# Patient Record
Sex: MALE | Race: WHITE | NOT HISPANIC OR LATINO | Employment: OTHER | URBAN - METROPOLITAN AREA
[De-identification: names, ages, dates, MRNs, and addresses within clinical notes are randomized per-mention and may not be internally consistent; named-entity substitution may affect disease eponyms.]

---

## 2018-06-26 ENCOUNTER — TELEPHONE (OUTPATIENT)
Dept: UROLOGY | Facility: CLINIC | Age: 83
End: 2018-06-26

## 2018-06-26 NOTE — TELEPHONE ENCOUNTER
----- Message from Elza Gonzalez sent at 6/26/2018  1:30 PM CDT -----  Type:  Sooner Apoointment Request    Caller is requesting a sooner appointment.  Caller declined first available appointment listed below.  Caller will not accept being placed on the waitlist and is requesting a message be sent to doctor.    Name of Caller: pt  When is the first available appointment?  oct  Symptoms:  Blood  In  urine  Best Call Back Number:  [phone]  Additional Information: pt  Wants to  Be seen   Sooner  By  Dr zelaya only

## 2018-07-06 ENCOUNTER — OFFICE VISIT (OUTPATIENT)
Dept: UROLOGY | Facility: CLINIC | Age: 83
End: 2018-07-06
Payer: MEDICARE

## 2018-07-06 VITALS
WEIGHT: 172 LBS | SYSTOLIC BLOOD PRESSURE: 85 MMHG | DIASTOLIC BLOOD PRESSURE: 58 MMHG | HEIGHT: 70 IN | HEART RATE: 68 BPM | BODY MASS INDEX: 24.62 KG/M2

## 2018-07-06 DIAGNOSIS — R31.9 HEMATURIA OF UNDIAGNOSED CAUSE: ICD-10-CM

## 2018-07-06 PROCEDURE — 99999 PR PBB SHADOW E&M-EST. PATIENT-LVL II: CPT | Mod: PBBFAC,,, | Performed by: UROLOGY

## 2018-07-06 PROCEDURE — 99203 OFFICE O/P NEW LOW 30 MIN: CPT | Mod: S$GLB,,, | Performed by: UROLOGY

## 2018-07-06 RX ORDER — RAMIPRIL 2.5 MG/1
CAPSULE ORAL
COMMUNITY
Start: 2018-06-12

## 2018-07-06 RX ORDER — GLUCOSAMINE/CHONDROITIN/C/MANG 500-400 MG
1500 CAPSULE ORAL
COMMUNITY

## 2018-07-06 RX ORDER — IBUPROFEN 100 MG/5ML
1000 SUSPENSION, ORAL (FINAL DOSE FORM) ORAL
COMMUNITY

## 2018-07-06 RX ORDER — CLOPIDOGREL BISULFATE 75 MG/1
75 TABLET ORAL
COMMUNITY

## 2018-07-06 RX ORDER — VITAMIN K2 40 MCG
1 TABLET ORAL
COMMUNITY

## 2018-07-06 RX ORDER — ROSUVASTATIN CALCIUM 10 MG/1
10 TABLET, COATED ORAL
COMMUNITY

## 2018-07-06 RX ORDER — PROPAFENONE HYDROCHLORIDE 225 MG/1
225 TABLET, FILM COATED ORAL
COMMUNITY

## 2018-07-06 NOTE — PROGRESS NOTES
CHIEF COMPLAINT:  Gross hematuria.    HISTORY OF PRESENT ILLNESS:  Mr. Trevino is an 89-year-old male who suffered  a fall in 04/27/2018 at home.  At that time, he consulted the Emergency  Room at [facility] and he had bruises in the upper extremities and  also in the right flank.  No CT scan or x-rays were performed at that time  according to the patient.  Everything went fine.  He was discharged home  with no observation.  He did well until approximately 3 weeks ago when he  started experiencing gross painless hematuria.  He refers that the  hematuria was only one episode.  He did reconsult the Emergency Room at  that time and he was referred to our office for further evaluation and  treatment.  The patient explained to me that he is not interested in any  workup due to his advanced age, he will not undergo a cystoscopy, renal  ultrasound and he did not even wants to have a urine cytology.  He did not  provide us with a specimen.  He refers that for him it is obvious that is a  relation between his fall and the hematuria a couple of months later.  I  explained to him that usually the hematuria is seen at the time of the  accident, but can be understand that there may be a hematoma that has  drained eventually.  I tried to convince the patient to at least to undergo  an ultrasound of the kidneys and retroperitoneum, but he refused it.  He  expressed to me that if he sees blood in the urine again, he is going to  return and at that point, he may change his mind, but at this point, he  feels free not doing anything.  All the questions were answered to his  satisfaction.  I was unable to take review of systems or physical  examination in this patient because he felt that this consultation was not  necessary.  He left the office in satisfactory condition.        EOR/HN dd: 07/06/2018 17:08:34 (CDT)   td: 07/07/2018 00:00:01 (CDT)  Doc ID #[id number]   Job ID #[id number]    CC:

## 2020-12-07 ENCOUNTER — OFFICE VISIT (OUTPATIENT)
Dept: FAMILY MEDICINE | Facility: CLINIC | Age: 85
End: 2020-12-07
Payer: MEDICARE

## 2020-12-07 VITALS
SYSTOLIC BLOOD PRESSURE: 108 MMHG | DIASTOLIC BLOOD PRESSURE: 62 MMHG | HEIGHT: 71 IN | WEIGHT: 180 LBS | HEART RATE: 73 BPM | BODY MASS INDEX: 25.2 KG/M2 | TEMPERATURE: 98 F | OXYGEN SATURATION: 98 % | RESPIRATION RATE: 18 BRPM

## 2020-12-07 DIAGNOSIS — R26.81 UNSTEADINESS ON FEET: ICD-10-CM

## 2020-12-07 DIAGNOSIS — I10 ESSENTIAL HYPERTENSION: ICD-10-CM

## 2020-12-07 DIAGNOSIS — E78.00 HYPERCHOLESTEROLEMIA: ICD-10-CM

## 2020-12-07 DIAGNOSIS — I25.10 CORONARY ARTERY DISEASE INVOLVING NATIVE CORONARY ARTERY OF NATIVE HEART WITHOUT ANGINA PECTORIS: Primary | ICD-10-CM

## 2020-12-07 PROCEDURE — 99214 OFFICE O/P EST MOD 30 MIN: CPT | Mod: S$GLB,,, | Performed by: INTERNAL MEDICINE

## 2020-12-07 PROCEDURE — 1100F PTFALLS ASSESS-DOCD GE2>/YR: CPT | Mod: CPTII,S$GLB,, | Performed by: INTERNAL MEDICINE

## 2020-12-07 PROCEDURE — 99214 PR OFFICE/OUTPT VISIT, EST, LEVL IV, 30-39 MIN: ICD-10-PCS | Mod: S$GLB,,, | Performed by: INTERNAL MEDICINE

## 2020-12-07 PROCEDURE — 1126F PR PAIN SEVERITY QUANTIFIED, NO PAIN PRESENT: ICD-10-PCS | Mod: S$GLB,,, | Performed by: INTERNAL MEDICINE

## 2020-12-07 PROCEDURE — 1126F AMNT PAIN NOTED NONE PRSNT: CPT | Mod: S$GLB,,, | Performed by: INTERNAL MEDICINE

## 2020-12-07 PROCEDURE — 1100F PR PT FALLS ASSESS DOC 2+ FALLS/FALL W/INJURY/YR: ICD-10-PCS | Mod: CPTII,S$GLB,, | Performed by: INTERNAL MEDICINE

## 2020-12-07 PROCEDURE — 3288F FALL RISK ASSESSMENT DOCD: CPT | Mod: CPTII,S$GLB,, | Performed by: INTERNAL MEDICINE

## 2020-12-07 PROCEDURE — 3288F PR FALLS RISK ASSESSMENT DOCUMENTED: ICD-10-PCS | Mod: CPTII,S$GLB,, | Performed by: INTERNAL MEDICINE

## 2020-12-07 RX ORDER — ACETAMINOPHEN 500 MG
1 TABLET ORAL DAILY
COMMUNITY

## 2020-12-07 RX ORDER — VIT C/E/ZN/COPPR/LUTEIN/ZEAXAN 250MG-90MG
1000 CAPSULE ORAL DAILY
COMMUNITY

## 2020-12-07 RX ORDER — BENAZEPRIL HYDROCHLORIDE 5 MG/1
5 TABLET ORAL DAILY
COMMUNITY

## 2020-12-07 NOTE — PROGRESS NOTES
Subjective:       Patient ID: Leonardo Trevino is a 89 y.o. male.    Chief Complaint: Follow-up    Patient is here for follow-up of his chronic hypertension and prior CBCs and hypercholesterolemia.  On review of the medication list there are no need for refills at this time.    With regards to the review of systems is unremarkable as it pertains to Cardiovascular central nervous system  GI or musculoskeletal complaints.    Patient is up-to-date with his flu shot for the season.    There is no symptomatology to suggest COVID-19.    He sequela.  These doses seem to be has had 3 or 4 falls in the last 120 days so far without any significant sequela.  This does not seem to be related to a syncopal episode but nor can he gave a history to suggest that he was tripping on something.  He still uses a walking cane for stability and safety.  I do not believe that the patient would benefit from physical therapy at this time nor is he agreeable to same.    His next follow-up with cardiologist Dr. Glen Chao will be the 2nd to 3rd week in January of 2021.    Review of Systems   HENT: Negative for drooling and mouth sores.    Respiratory: Negative for stridor.    Gastrointestinal: Negative for rectal pain.   Genitourinary: Negative for decreased urine volume and enuresis.   Allergic/Immunologic: Negative for frequent infections.   All other systems reviewed and are negative.        Objective:      Physical Exam  Vitals signs and nursing note reviewed.   Constitutional:       Appearance: Normal appearance. He is obese.   HENT:      Head: Normocephalic and atraumatic.      Right Ear: Tympanic membrane, ear canal and external ear normal. There is no impacted cerumen.      Left Ear: Tympanic membrane, ear canal and external ear normal. There is no impacted cerumen.      Nose: No congestion or rhinorrhea.      Mouth/Throat:      Mouth: Mucous membranes are moist.      Pharynx: No oropharyngeal exudate or posterior oropharyngeal  erythema.   Eyes:      General: No scleral icterus.        Right eye: No discharge.         Left eye: No discharge.      Extraocular Movements: Extraocular movements intact.      Conjunctiva/sclera: Conjunctivae normal.      Pupils: Pupils are equal, round, and reactive to light.   Neck:      Musculoskeletal: Normal range of motion and neck supple. No neck rigidity or muscular tenderness.      Vascular: No carotid bruit.   Cardiovascular:      Rate and Rhythm: Normal rate and regular rhythm.      Pulses: Normal pulses.      Heart sounds: Normal heart sounds. No murmur. No friction rub. No gallop.       Comments: No significant ectopy was auscultated.  No murmur or gallop was identified.  Pulmonary:      Effort: Pulmonary effort is normal. No respiratory distress.      Breath sounds: Normal breath sounds. No stridor. No wheezing, rhonchi or rales.   Chest:      Chest wall: No tenderness.   Abdominal:      General: Abdomen is flat. Bowel sounds are normal. There is no distension.      Palpations: Abdomen is soft. There is no mass.      Tenderness: There is no abdominal tenderness. There is no right CVA tenderness, left CVA tenderness, guarding or rebound.      Hernia: No hernia is present.   Genitourinary:     Penis: Normal.       Scrotum/Testes: Normal.      Prostate: Normal.      Rectum: Normal. Guaiac result negative.   Musculoskeletal: Normal range of motion.         General: No swelling, tenderness, deformity or signs of injury.      Right lower leg: No edema.      Left lower leg: No edema.   Lymphadenopathy:      Cervical: No cervical adenopathy.   Skin:     General: Skin is warm and dry.      Capillary Refill: Capillary refill takes 2 to 3 seconds.      Coloration: Skin is not jaundiced or pale.      Findings: No bruising, erythema, lesion or rash.   Neurological:      General: No focal deficit present.      Mental Status: He is alert and oriented to person, place, and time. Mental status is at baseline.       Cranial Nerves: No cranial nerve deficit.      Sensory: No sensory deficit.      Motor: No weakness.      Coordination: Coordination normal.      Gait: Gait normal.      Deep Tendon Reflexes: Reflexes normal.   Psychiatric:         Mood and Affect: Mood normal.         Behavior: Behavior normal.         Thought Content: Thought content normal.         Judgment: Judgment normal.         Assessment:       1. Coronary artery disease involving native coronary artery of native heart without angina pectoris    2. Essential hypertension    3. Hypercholesterolemia    4. Unsteadiness on feet        Plan:   Patient is considered to be stable in his current condition with his current therapy and no changes are in 13 at this time.  Plans are for him to follow-up at the office in 4 months with no lab or prior seems Dr. Chao is covering out same.  He is very well aware of the signs and symptoms to watch for and to seek medical attention in case the superior

## 2021-01-13 ENCOUNTER — IMMUNIZATION (OUTPATIENT)
Dept: PRIMARY CARE CLINIC | Facility: CLINIC | Age: 86
End: 2021-01-13
Payer: MEDICARE

## 2021-01-13 DIAGNOSIS — Z23 NEED FOR VACCINATION: ICD-10-CM

## 2021-01-13 PROCEDURE — 0001A COVID-19, MRNA, LNP-S, PF, 30 MCG/0.3 ML DOSE VACCINE: ICD-10-PCS | Mod: S$GLB,,, | Performed by: FAMILY MEDICINE

## 2021-01-13 PROCEDURE — 91300 COVID-19, MRNA, LNP-S, PF, 30 MCG/0.3 ML DOSE VACCINE: CPT | Mod: S$GLB,,, | Performed by: FAMILY MEDICINE

## 2021-01-13 PROCEDURE — 0001A COVID-19, MRNA, LNP-S, PF, 30 MCG/0.3 ML DOSE VACCINE: CPT | Mod: S$GLB,,, | Performed by: FAMILY MEDICINE

## 2021-01-13 PROCEDURE — 91300 COVID-19, MRNA, LNP-S, PF, 30 MCG/0.3 ML DOSE VACCINE: ICD-10-PCS | Mod: S$GLB,,, | Performed by: FAMILY MEDICINE

## 2021-02-03 ENCOUNTER — IMMUNIZATION (OUTPATIENT)
Dept: PRIMARY CARE CLINIC | Facility: CLINIC | Age: 86
End: 2021-02-03
Payer: MEDICARE

## 2021-02-03 DIAGNOSIS — Z23 NEED FOR VACCINATION: Primary | ICD-10-CM

## 2021-02-03 PROCEDURE — 0002A COVID-19, MRNA, LNP-S, PF, 30 MCG/0.3 ML DOSE VACCINE: ICD-10-PCS | Mod: S$GLB,,, | Performed by: FAMILY MEDICINE

## 2021-02-03 PROCEDURE — 0002A COVID-19, MRNA, LNP-S, PF, 30 MCG/0.3 ML DOSE VACCINE: CPT | Mod: S$GLB,,, | Performed by: FAMILY MEDICINE

## 2021-02-03 PROCEDURE — 91300 COVID-19, MRNA, LNP-S, PF, 30 MCG/0.3 ML DOSE VACCINE: ICD-10-PCS | Mod: S$GLB,,, | Performed by: FAMILY MEDICINE

## 2021-02-03 PROCEDURE — 91300 COVID-19, MRNA, LNP-S, PF, 30 MCG/0.3 ML DOSE VACCINE: CPT | Mod: S$GLB,,, | Performed by: FAMILY MEDICINE
